# Patient Record
Sex: MALE | URBAN - NONMETROPOLITAN AREA
[De-identification: names, ages, dates, MRNs, and addresses within clinical notes are randomized per-mention and may not be internally consistent; named-entity substitution may affect disease eponyms.]

---

## 2017-09-29 PROBLEM — H25.13: Noted: 2017-09-29

## 2019-10-04 ENCOUNTER — IMPORTED ENCOUNTER (OUTPATIENT)
Dept: URBAN - NONMETROPOLITAN AREA CLINIC 1 | Facility: CLINIC | Age: 39
End: 2019-10-04

## 2019-10-04 PROCEDURE — 92015 DETERMINE REFRACTIVE STATE: CPT

## 2019-10-04 PROCEDURE — 92014 COMPRE OPH EXAM EST PT 1/>: CPT

## 2019-10-04 NOTE — PATIENT DISCUSSION
Myopia/Astigmatism OUDiscussed refractive status in detail with patient. New glasses Rx given today. Continue to monitor. NIDDM Sans Retinopathy OUDiscussed findings of exam in detail with the patient. Discussed the risk of diabetic damage of the retina with potential vision loss and the importance of routine follow-up. Emphasized strict blood sugar control. Continue to monitor. [facility]U Discussed diagnosis with patientReviewed symptoms related to cataract progressionNo treatment needed at this timeContinue to monitor

## 2020-10-09 ENCOUNTER — IMPORTED ENCOUNTER (OUTPATIENT)
Dept: URBAN - NONMETROPOLITAN AREA CLINIC 1 | Facility: CLINIC | Age: 40
End: 2020-10-09

## 2020-10-09 PROCEDURE — 92014 COMPRE OPH EXAM EST PT 1/>: CPT

## 2020-10-09 PROCEDURE — 92015 DETERMINE REFRACTIVE STATE: CPT

## 2020-10-09 NOTE — PATIENT DISCUSSION
Myopia/Astigmatism OUDiscussed refractive status in detail with patient. New glasses Rx given today. Continue to monitor. NIDDM Sans Retinopathy OUDiscussed findings of exam in detail with the patient. Discussed the risk of diabetic damage of the retina with potential vision loss and the importance of routine follow-up. Emphasized strict blood sugar control. Continue to monitor. [facility] Discussed diagnosis with patientReviewed symptoms related to cataract progressionNo treatment needed at this timeContinue to monitorOptos done today; normal ocular health no BDR OU.

## 2021-10-11 ENCOUNTER — IMPORTED ENCOUNTER (OUTPATIENT)
Dept: URBAN - NONMETROPOLITAN AREA CLINIC 1 | Facility: CLINIC | Age: 41
End: 2021-10-11

## 2021-10-11 PROCEDURE — 92014 COMPRE OPH EXAM EST PT 1/>: CPT

## 2021-10-11 PROCEDURE — 92015 DETERMINE REFRACTIVE STATE: CPT

## 2022-04-16 ASSESSMENT — TONOMETRY
OD_IOP_MMHG: 18
OD_IOP_MMHG: 17
OS_IOP_MMHG: 17
OS_IOP_MMHG: 17
OS_IOP_MMHG: 18
OD_IOP_MMHG: 17

## 2022-04-16 ASSESSMENT — VISUAL ACUITY
OS_SC: 20/25-
OS_SC: 20/30-
OD_CC: J1+
OD_SC: 20/20
OD_SC: 20/20
OS_SC: 20/29
OS_CC: J1+
OD_SC: 20/22+

## 2023-04-27 ENCOUNTER — ESTABLISHED PATIENT (OUTPATIENT)
Dept: URBAN - NONMETROPOLITAN AREA CLINIC 1 | Facility: CLINIC | Age: 43
End: 2023-04-27

## 2023-04-27 DIAGNOSIS — H52.13: ICD-10-CM

## 2023-04-27 DIAGNOSIS — H52.223: ICD-10-CM

## 2023-04-27 PROCEDURE — 92014 COMPRE OPH EXAM EST PT 1/>: CPT

## 2023-04-27 PROCEDURE — 92015 DETERMINE REFRACTIVE STATE: CPT

## 2023-04-27 ASSESSMENT — VISUAL ACUITY
OS_CC: 20/32-2
OD_CC: 20/29

## 2023-04-27 ASSESSMENT — TONOMETRY
OS_IOP_MMHG: 16
OD_IOP_MMHG: 16

## 2025-05-05 ENCOUNTER — COMPREHENSIVE EXAM (OUTPATIENT)
Age: 45
End: 2025-05-05

## 2025-05-05 DIAGNOSIS — H52.223: ICD-10-CM

## 2025-05-05 DIAGNOSIS — H52.13: ICD-10-CM

## 2025-05-05 PROCEDURE — 92015 DETERMINE REFRACTIVE STATE: CPT

## 2025-05-05 PROCEDURE — 92014 COMPRE OPH EXAM EST PT 1/>: CPT
